# Patient Record
Sex: FEMALE | Race: OTHER | URBAN - METROPOLITAN AREA
[De-identification: names, ages, dates, MRNs, and addresses within clinical notes are randomized per-mention and may not be internally consistent; named-entity substitution may affect disease eponyms.]

---

## 2022-05-24 ENCOUNTER — HOSPITAL ENCOUNTER (EMERGENCY)
Facility: HOSPITAL | Age: 37
Discharge: HOME | End: 2022-05-24
Attending: EMERGENCY MEDICINE

## 2022-05-24 VITALS
DIASTOLIC BLOOD PRESSURE: 58 MMHG | OXYGEN SATURATION: 100 % | SYSTOLIC BLOOD PRESSURE: 109 MMHG | RESPIRATION RATE: 16 BRPM | TEMPERATURE: 97.6 F | HEART RATE: 91 BPM

## 2022-05-24 DIAGNOSIS — N12 PYELONEPHRITIS: Primary | ICD-10-CM

## 2022-05-24 DIAGNOSIS — R30.0 DYSURIA: ICD-10-CM

## 2022-05-24 LAB
BACTERIA UR QL AUTO: ABNORMAL /HPF
BILIRUB UR QL STRIP: NEGATIVE
CLARITY UR: ABNORMAL
COLOR UR: YELLOW
EXT PREG TEST URINE: NEGATIVE
EXT. CONTROL ED NAV: NORMAL
GLUCOSE UR STRIP-MCNC: NEGATIVE MG/DL
HGB UR QL STRIP.AUTO: ABNORMAL
KETONES UR STRIP-MCNC: NEGATIVE MG/DL
LEUKOCYTE ESTERASE UR QL STRIP: ABNORMAL
NITRITE UR QL STRIP: POSITIVE
NON-SQ EPI CELLS URNS QL MICRO: ABNORMAL /HPF
PH UR STRIP.AUTO: 6 [PH] (ref 4.5–8)
PROT UR STRIP-MCNC: ABNORMAL MG/DL
RBC #/AREA URNS AUTO: ABNORMAL /HPF
SP GR UR STRIP.AUTO: 1.01 (ref 1–1.03)
UROBILINOGEN UR QL STRIP.AUTO: 0.2 E.U./DL
WBC #/AREA URNS AUTO: ABNORMAL /HPF
WBC CLUMPS # UR AUTO: PRESENT /UL

## 2022-05-24 PROCEDURE — 81001 URINALYSIS AUTO W/SCOPE: CPT

## 2022-05-24 PROCEDURE — 96372 THER/PROPH/DIAG INJ SC/IM: CPT

## 2022-05-24 PROCEDURE — 99284 EMERGENCY DEPT VISIT MOD MDM: CPT | Performed by: EMERGENCY MEDICINE

## 2022-05-24 PROCEDURE — 87086 URINE CULTURE/COLONY COUNT: CPT

## 2022-05-24 PROCEDURE — 87186 SC STD MICRODIL/AGAR DIL: CPT

## 2022-05-24 PROCEDURE — 87077 CULTURE AEROBIC IDENTIFY: CPT

## 2022-05-24 PROCEDURE — 81025 URINE PREGNANCY TEST: CPT | Performed by: EMERGENCY MEDICINE

## 2022-05-24 PROCEDURE — 99283 EMERGENCY DEPT VISIT LOW MDM: CPT

## 2022-05-24 RX ORDER — NAPROXEN 500 MG/1
500 TABLET ORAL 2 TIMES DAILY PRN
Qty: 30 TABLET | Refills: 0 | Status: SHIPPED | OUTPATIENT
Start: 2022-05-24 | End: 2022-06-08

## 2022-05-24 RX ORDER — ACETAMINOPHEN 325 MG/1
650 TABLET ORAL ONCE
Status: COMPLETED | OUTPATIENT
Start: 2022-05-24 | End: 2022-05-24

## 2022-05-24 RX ORDER — CEFUROXIME AXETIL 500 MG/1
500 TABLET ORAL EVERY 12 HOURS SCHEDULED
Qty: 28 TABLET | Refills: 0 | Status: SHIPPED | OUTPATIENT
Start: 2022-05-24 | End: 2022-06-07

## 2022-05-24 RX ORDER — CEPHALEXIN 500 MG/1
500 CAPSULE ORAL ONCE
Status: COMPLETED | OUTPATIENT
Start: 2022-05-24 | End: 2022-05-24

## 2022-05-24 RX ORDER — KETOROLAC TROMETHAMINE 30 MG/ML
30 INJECTION, SOLUTION INTRAMUSCULAR; INTRAVENOUS ONCE
Status: COMPLETED | OUTPATIENT
Start: 2022-05-24 | End: 2022-05-24

## 2022-05-24 RX ADMIN — ACETAMINOPHEN 650 MG: 325 TABLET ORAL at 03:08

## 2022-05-24 RX ADMIN — KETOROLAC TROMETHAMINE 30 MG: 30 INJECTION, SOLUTION INTRAMUSCULAR at 03:08

## 2022-05-24 RX ADMIN — CEPHALEXIN 500 MG: 500 CAPSULE ORAL at 03:08

## 2022-05-24 NOTE — ED ATTENDING ATTESTATION
5/24/2022  ISixto MD, saw and evaluated the patient  I have discussed the patient with the resident/non-physician practitioner and agree with the resident's/non-physician practitioner's findings, Plan of Care, and MDM as documented in the resident's/non-physician practitioner's note, except where noted  All available labs and Radiology studies were reviewed  I was present for key portions of any procedure(s) performed by the resident/non-physician practitioner and I was immediately available to provide assistance  At this point I agree with the current assessment done in the Emergency Department  I have conducted an independent evaluation of this patient a history and physical is as follows:    ED Course      Emergency Department Note- Narda Lopez 31 y o  female MRN: [id number]    Unit/Bed#: ED 15 Encounter: [id number]    Narda Lopez is a 31 y o  female who presents with   Chief Complaint   Patient presents with    Urinary Urgency     X3 days pain with urination, urgency to void  Severe pain , started tonight travels up to L flank         History of Present Illness   HPI:  Narda Lopez is a 31 y o  female who presents for evaluation of:  Urinary urgency, dysuria, flank discomfort on L, and suprapubic discomfort  Patient denies associated fevers and chills  Review of Systems   Constitutional: Negative for chills and fever  HENT: Negative for congestion and rhinorrhea  Respiratory: Negative for cough and shortness of breath  Genitourinary: Positive for dysuria, flank pain and urgency  All other systems reviewed and are negative  Historical Information   No past medical history on file  No past surgical history on file    Social History   Social History     Substance and Sexual Activity   Alcohol Use Never     Social History     Substance and Sexual Activity   Drug Use Never     Social History     Tobacco Use   Smoking Status Current Every Day Smoker    Types: Cigarettes   Smokeless Tobacco Never Used     Family History: No family history on file  Meds/Allergies   PTA meds:   None     Allergies   Allergen Reactions    Contrast [Iodinated Diagnostic Agents] Seizures       Objective   First Vitals:   Blood Pressure: 109/58 (05/24/22 0230)  Pulse: 91 (05/24/22 0230)  Respirations: 16 (05/24/22 0230)  SpO2: 100 % (05/24/22 0230)    Current Vitals:   Blood Pressure: 109/58 (05/24/22 0230)  Pulse: 91 (05/24/22 0230)  Respirations: 16 (05/24/22 0230)  SpO2: 100 % (05/24/22 0230)    No intake or output data in the 24 hours ending 05/24/22 0249    Invasive Devices  Report    None                 Physical Exam  Vitals and nursing note reviewed  Constitutional:       General: She is not in acute distress  Appearance: Normal appearance  She is well-developed  HENT:      Head: Normocephalic and atraumatic  Right Ear: External ear normal       Left Ear: External ear normal       Nose: Nose normal       Mouth/Throat:      Pharynx: No oropharyngeal exudate  Eyes:      Conjunctiva/sclera: Conjunctivae normal       Pupils: Pupils are equal, round, and reactive to light  Cardiovascular:      Rate and Rhythm: Normal rate and regular rhythm  Pulmonary:      Effort: Pulmonary effort is normal  No respiratory distress  Abdominal:      General: Abdomen is flat  There is no distension  Palpations: Abdomen is soft  Musculoskeletal:         General: No deformity  Normal range of motion  Cervical back: Normal range of motion and neck supple  Skin:     General: Skin is warm and dry  Capillary Refill: Capillary refill takes less than 2 seconds  Neurological:      General: No focal deficit present  Mental Status: She is alert and oriented to person, place, and time  Mental status is at baseline        Coordination: Coordination normal    Psychiatric:         Mood and Affect: Mood normal          Behavior: Behavior normal          Thought Content: Thought content normal          Judgment: Judgment normal            Medical Decision Makin  Dysuria, flank pain: UA; treat for UTI    Recent Results (from the past 36 hour(s))   Urine Macroscopic, POC    Collection Time: 22  2:48 AM   Result Value Ref Range    Color, UA Yellow     Clarity, UA Cloudy     pH, UA 6 0 4 5 - 8 0    Leukocytes, UA Large (A) Negative    Nitrite, UA Positive (A) Negative    Protein,  (2+) (A) Negative mg/dl    Glucose, UA Negative Negative mg/dl    Ketones, UA Negative Negative mg/dl    Urobilinogen, UA 0 2 0 2, 1 0 E U /dl E U /dl    Bilirubin, UA Negative Negative    Blood, UA Large (A) Negative    Specific Gravity, UA 1 015 1 003 - 1 030     No orders to display         Portions of the record may have been created with voice recognition software  Occasional wrong word or "sound a like" substitutions may have occurred due to the inherent limitations of voice recognition software  Read the chart carefully and recognize, using context, where substitutions have occurred            Critical Care Time  Procedures

## 2022-05-24 NOTE — ED PROVIDER NOTES
History  Chief Complaint   Patient presents with    Urinary Urgency     X3 days pain with urination, urgency to void  Severe pain , started tonight travels up to L flank     56-year-old female presents emergency department for evaluation of 3 days of dysuria and urinary urgency  She states today she started with right-sided flank pain which prompted her evaluation  She states she has had a prior UTI although not this severe  She denies any fever, chills, chest pain, cough, or shortness of breath  No abdominal pain, nausea or vomiting  No constipation or diarrhea  No abnormal vaginal bleeding or discharge  Patient states she does have a history of pancreatitis, prior Caesarean sections, appendectomy, and cholecystectomy  History taken utilizing   None       No past medical history on file  No past surgical history on file  No family history on file  I have reviewed and agree with the history as documented  E-Cigarette/Vaping     E-Cigarette/Vaping Substances     Social History     Tobacco Use    Smoking status: Current Every Day Smoker     Types: Cigarettes    Smokeless tobacco: Never Used   Substance Use Topics    Alcohol use: Never    Drug use: Never        Review of Systems   Constitutional: Negative for chills and fever  HENT: Negative for ear pain and sore throat  Eyes: Negative for pain and visual disturbance  Respiratory: Negative for cough and shortness of breath  Cardiovascular: Negative for chest pain and palpitations  Gastrointestinal: Negative for abdominal pain, diarrhea, nausea and vomiting  Genitourinary: Positive for dysuria, flank pain and urgency  Negative for hematuria, vaginal bleeding and vaginal discharge  Musculoskeletal: Negative for arthralgias and back pain  Skin: Negative for color change and rash  Neurological: Negative for seizures and syncope  All other systems reviewed and are negative        Physical Exam  ED Triage Vitals   Temperature Pulse Respirations Blood Pressure SpO2   05/24/22 0340 05/24/22 0230 05/24/22 0230 05/24/22 0230 05/24/22 0230   97 6 °F (36 4 °C) 91 16 109/58 100 %      Temp Source Heart Rate Source Patient Position - Orthostatic VS BP Location FiO2 (%)   05/24/22 0340 05/24/22 0230 05/24/22 0230 05/24/22 0230 --   Tympanic Monitor Sitting Right arm       Pain Score       05/24/22 0230       10 - Worst Possible Pain             Orthostatic Vital Signs  Vitals:    05/24/22 0230   BP: 109/58   Pulse: 91   Patient Position - Orthostatic VS: Sitting       Physical Exam  Vitals reviewed  Constitutional:       General: She is not in acute distress  HENT:      Head: Normocephalic and atraumatic  Right Ear: External ear normal       Left Ear: External ear normal       Nose: Nose normal       Mouth/Throat:      Mouth: Mucous membranes are moist    Eyes:      Extraocular Movements: Extraocular movements intact  Conjunctiva/sclera: Conjunctivae normal       Pupils: Pupils are equal, round, and reactive to light  Cardiovascular:      Rate and Rhythm: Normal rate and regular rhythm  Pulses: Normal pulses  Pulmonary:      Effort: Pulmonary effort is normal  No respiratory distress  Breath sounds: No stridor  Abdominal:      General: Abdomen is flat  Bowel sounds are normal       Tenderness: There is no abdominal tenderness  There is no guarding or rebound  Musculoskeletal:         General: No deformity  Normal range of motion  Cervical back: Normal range of motion and neck supple  Skin:     General: Skin is warm and dry  Capillary Refill: Capillary refill takes less than 2 seconds  Neurological:      General: No focal deficit present  Mental Status: She is alert and oriented to person, place, and time     Psychiatric:         Mood and Affect: Mood normal          Behavior: Behavior normal          ED Medications  Medications   ketorolac (TORADOL) injection 30 mg (30 mg Intramuscular Given 5/24/22 0308)   acetaminophen (TYLENOL) tablet 650 mg (650 mg Oral Given 5/24/22 0308)   cephalexin (KEFLEX) capsule 500 mg (500 mg Oral Given 5/24/22 0308)       Diagnostic Studies  Results Reviewed     Procedure Component Value Units Date/Time    Urine Microscopic [[id number]]  (Abnormal) Collected: 05/24/22 0248    Lab Status: Final result Specimen: Urine, Clean Catch Updated: 05/24/22 0336     RBC, UA Innumerable /hpf      WBC, UA Innumerable /hpf      Epithelial Cells None Seen /hpf      Bacteria, UA Innumerable /hpf      WBC Clumps PRESENT    Urine culture [[id number]] Collected: 05/24/22 0248    Lab Status: In process Specimen: Urine, Clean Catch Updated: 05/24/22 0336    POCT pregnancy, urine [[id number]]  (Normal) Resulted: 05/24/22 0301    Lab Status: Final result Updated: 05/24/22 0301     EXT PREG TEST UR (Ref: Negative) Negative     Control Valid    Urine Macroscopic, POC [[id number]]  (Abnormal) Collected: 05/24/22 0248    Lab Status: Final result Specimen: Urine Updated: 05/24/22 0249     Color, UA Yellow     Clarity, UA Cloudy     pH, UA 6 0     Leukocytes, UA Large     Nitrite, UA Positive     Protein,  (2+) mg/dl      Glucose, UA Negative mg/dl      Ketones, UA Negative mg/dl      Urobilinogen, UA 0 2 E U /dl      Bilirubin, UA Negative     Blood, UA Large     Specific [place], UA 1 015    Narrative:      CLINITEK RESULT                 No orders to display         Procedures  Procedures      ED Course  ED Course as of 05/24/22 0343   Tue May 24, 2022   0250 Leukocytes, UA(!): Large   0250 Nitrite, UA(!): Positive   0303 PREGNANCY TEST URINE: Negative                             SBIRT 20yo+    Flowsheet Row Most Recent Value   SBIRT (23 yo +)    In order to provide better care to our patients, we are screening all of our patients for alcohol and drug use  Would it be okay to ask you these screening questions?  Yes Filed at: 05/24/2022 0236   Initial Alcohol Screen: US AUDIT-C 1  How often do you have a drink containing alcohol? 0 Filed at: 05/24/2022 0236   2  How many drinks containing alcohol do you have on a typical day you are drinking? 0 Filed at: 05/24/2022 0236   3b  FEMALE Any Age, or MALE 65+: How often do you have 4 or more drinks on one occassion? 0 Filed at: 05/24/2022 0236   Audit-C Score 0 Filed at: 05/24/2022 0236   FARA: How many times in the past year have you    Used an illegal drug or used a prescription medication for non-medical reasons? Never Filed at: 05/24/2022 0236                MDM  Number of Diagnoses or Management Options  Dysuria  Pyelonephritis  Diagnosis management comments: 79-year-old female presents the ED for evaluation of urinary symptoms and right flank pain  On exam she is uncomfortable appearing but in no acute distress  Abdomen is benign, she does have CVA tenderness on the right  Urinalysis indicative of infection  Will treat for pyelonephritis  Will give dose of antibiotic here prescription  She was given strict return precautions  Disposition  Final diagnoses:   Pyelonephritis   Dysuria     Time reflects when diagnosis was documented in both MDM as applicable and the Disposition within this note     Time User Action Codes Description Comment    5/24/2022  2:52 AM Check, Alleen Smoker Add [N12] Pyelonephritis     5/24/2022  2:52 AM Check, Alleen Smoker Add [R30 0] Dysuria       ED Disposition     ED Disposition   Discharge    Condition   Stable    Date/Time   Tue May 24, 2022  2:53 AM    Comment   Lionel Miller discharge to home/self care                 Follow-up Information     Follow up With Specialties Details Why Contact Info Additional [address] Emergency Department Emergency Medicine  If symptoms worsen [address]  [address] [place] [address] Emergency Department, [address] [address] Patient's Medications   Discharge Prescriptions    CEFUROXIME (CEFTIN) 500 MG TABLET    Take 1 tablet (500 mg total) by mouth every 12 (twelve) hours for 14 days       Start Date: 5/24/2022 End Date: 6/7/2022       Order Dose: 500 mg       Quantity: 28 tablet    Refills: 0    NAPROXEN (NAPROSYN) 500 MG TABLET    Take 1 tablet (500 mg total) by mouth as needed in the morning and 1 tablet (500 mg total) as needed in the evening for mild pain or moderate pain  Do all this for up to 15 days  Start Date: 5/24/2022 End Date: 6/8/2022       Order Dose: 500 mg       Quantity: 30 tablet    Refills: 0     No discharge procedures on file  PDMP Review     None           ED Provider  Attending physically available and evaluated Lilliana Navarrete I managed the patient along with the ED Attending      Electronically Signed by         Meche Weaver MD  05/24/22 0843

## 2022-05-26 LAB — BACTERIA UR CULT: ABNORMAL

## 2023-04-20 ENCOUNTER — HOSPITAL ENCOUNTER (EMERGENCY)
Facility: HOSPITAL | Age: 38
Discharge: HOME | End: 2023-04-20
Attending: EMERGENCY MEDICINE | Admitting: EMERGENCY MEDICINE

## 2023-04-20 ENCOUNTER — APPOINTMENT (EMERGENCY)
Dept: RADIOLOGY | Facility: HOSPITAL | Age: 38
End: 2023-04-20

## 2023-04-20 VITALS
OXYGEN SATURATION: 97 % | RESPIRATION RATE: 17 BRPM | SYSTOLIC BLOOD PRESSURE: 129 MMHG | HEART RATE: 84 BPM | TEMPERATURE: 97.4 F | DIASTOLIC BLOOD PRESSURE: 68 MMHG

## 2023-04-20 DIAGNOSIS — M79.641 RIGHT HAND PAIN: Primary | ICD-10-CM

## 2023-04-20 RX ORDER — ACETAMINOPHEN 325 MG/1
650 TABLET ORAL ONCE
Status: COMPLETED | OUTPATIENT
Start: 2023-04-20 | End: 2023-04-20

## 2023-04-20 RX ADMIN — ACETAMINOPHEN 650 MG: 325 TABLET ORAL at 03:49

## 2023-04-20 NOTE — Clinical Note
Allison Irby was seen and treated in our emergency department on 4/20/2023  Limited use of right hand    Diagnosis:     Kaleb Reyes  may return to work on return date  She may return on this date: 04/21/2023         If you have any questions or concerns, please don't hesitate to call        Sukhdev Gonzales, DO    ______________________________           _______________          _______________  Hospital Representative                              Date                                Time

## 2023-04-20 NOTE — DISCHARGE INSTRUCTIONS
You were seen in the emergency department today for right hand pain  Your testing showed no evidence of fracture  Follow up with hand surgery if your pain does not improve  Take Tylenol / Ibuprofen as needed following the instructions on the bottle  Return to the emergency department for any new or concerning symptoms including worsening pain  Thank you for choosing [facility] for your care today

## 2023-04-20 NOTE — ED PROVIDER NOTES
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 Taking?   naproxen (Naprosyn) 500 mg tablet   No No   Sig: Take 1 tablet (500 mg total) by mouth as needed in the morning and 1 tablet (500 mg total) as needed in the evening for mild pain or moderate pain  Do all this for up to 15 days  Facility-Administered Medications: None       History reviewed  No pertinent past medical history  Past Surgical History:   Procedure Laterality Date   • APPENDECTOMY     • CHOLECYSTECTOMY         History reviewed  No pertinent family history  I have reviewed and agree with the history as documented      E-Cigarette/Vaping     E-Cigarette/Vaping Substances     Social History     Tobacco Use   • Smoking status: Every Day     Packs/day: 1 00     Types: Cigarettes   • Smokeless tobacco: Never   Substance Use Topics   • Alcohol use: Never   • Drug use: Never        Review of Systems    Physical Exam  ED Triage Vitals [04/20/23 0157]   Temperature Pulse Respirations Blood Pressure SpO2   (!) 97 4 °F (36 3 °C) 84 17 129/68 97 %      Temp Source Heart Rate Source Patient Position - Orthostatic VS BP Location FiO2 (%)   Tympanic Monitor Lying Right arm --      Pain Score       --             Orthostatic Vital Signs  Vitals:    04/20/23 0157   BP: 129/68   Pulse: 84   Patient Position - Orthostatic VS: Lying       Physical Exam    ED Medications  Medications - No data to display    Diagnostic Studies  Results Reviewed     None                 No orders to display         Procedures  Procedures      ED Course MDM      Disposition  Final diagnoses:   None     ED Disposition     None      Follow-up Information    None         Patient's Medications   Discharge Prescriptions    No medications on file     No discharge procedures on file  PDMP Review     None           ED Provider  Attending physically available and evaluated Almas Alas I managed the patient along with the ED Attending      Electronically Signed by Sher Silva MD (04/20 0691)      No acute osseous abnormality  Workstation performed: [id number]               Procedures  Procedures      ED Course                                       MDM   Patient is a 30-year-old female with PMH of no relevant past medical history who presents to the ED with right middle finger pain secondary to injury  Vital signs stable  On exam tenderness to palpation, intact distal sensation, normal motor function of middle finger  History and exam most consistent with strain  No evidence of ligamentous injury, fracture unlikely  Will obtain x-ray and treat pain control at this time  View ED course above for further discussion on patient workup  Imaging findings as interpreted by me: No acute fracture  I reviewed all testing with the patient  Upon re-evaluation pain improved, sensation remains intact  I have reviewed the patient's vital signs, nursing notes, and other relevant tests/information  I had a detailed discussion with the patient regarding the history, exam findings, and any diagnostic results  Plan to discharge home in stable condition with follow up with hand surgery  Discussed with patient who is agreeable to plan  I discussed discharge instructions, need for follow-up, and oral return precautions for what to return for in addition to the written return precautions and discharge instructions, specifically highlighting areas of special concern  The patient verbalized understanding of the discharge instructions and warnings that would necessitate return to the Emergency Department including worsening pain, decreased sensation  All questions the patient had were answered prior to discharge         Disposition  Final diagnoses:   Right hand pain     Time reflects when diagnosis was documented in both MDM as applicable and the Disposition within this note     Time User Action Codes Description Comment    4/20/2023  3:37 AM Shu Stewart [P86 526] Right hand pain       ED Disposition     ED Disposition   Discharge    Condition   Stable    Date/Time   Thu Apr 20, 2023  4:43 AM    Comment   Ingrid Gomez discharge to home/self care  Follow-up Information     Follow up With Specialties Details Why Contact Info    [employer]  Call  As needed [phone]            Discharge Medication List as of 4/20/2023  4:44 AM      CONTINUE these medications which have NOT CHANGED    Details   naproxen (Naprosyn) 500 mg tablet Take 1 tablet (500 mg total) by mouth as needed in the morning and 1 tablet (500 mg total) as needed in the evening for mild pain or moderate pain  Do all this for up to 15 days  , Starting Tue 5/24/2022, Until Wed 6/8/2022 at 2359, Normal               PDMP Review     None           ED Provider  Attending physically available and evaluated Ingrid Gomez  I managed the patient along with the ED Attending      Electronically Signed by         Ken Foreman DO  04/24/23 9655

## 2023-04-20 NOTE — ED ATTENDING ATTESTATION
4/20/2023  INataliia MD, saw and evaluated the patient  I have discussed the patient with the resident/non-physician practitioner and agree with the resident's/non-physician practitioner's findings, Plan of Care, and MDM as documented in the resident's/non-physician practitioner's note, except where noted  All available labs and Radiology studies were reviewed  I was present for key portions of any procedure(s) performed by the resident/non-physician practitioner and I was immediately available to provide assistance  At this point I agree with the current assessment done in the Emergency Department    I have conducted an independent evaluation of this patient a history and physical is as follows:    ED Course          Critical Care Time  Procedures

## 2023-05-08 NOTE — ED ATTENDING ATTESTATION
4/20/2023  IVivek MD, saw and evaluated the patient  I have discussed the patient with the resident/non-physician practitioner and agree with the resident's/non-physician practitioner's findings, Plan of Care, and MDM as documented in the resident's/non-physician practitioner's note, except where noted  All available labs and Radiology studies were reviewed  I was present for key portions of any procedure(s) performed by the resident/non-physician practitioner and I was immediately available to provide assistance  At this point I agree with the current assessment done in the Emergency Department  I have conducted an independent evaluation of this patient a history and physical is as follows:    ED Course     Patient presents for evaluation secondary to R hand pain after getting her middle finger bent backwards while at work  Reports pain in her hand and forearm, No additional injuries or complaints  A/P: R hand pain  Concern for hyperextension injury  Will check xray and have patient follow up with hand       Critical Care Time  Procedures

## 2023-09-18 NOTE — DISCHARGE INSTRUCTIONS
Take antibiotics as prescribed for a total of 14 days  Take Naprosyn 1 tablet every 12 hours as needed for pain    Follow-up with primary care physician Vital Signs Last 24 Hrs  T(C): 36.6 (18 Sep 2023 08:45), Max: 36.9 (18 Sep 2023 04:56)  T(F): 97.8 (18 Sep 2023 08:45), Max: 98.4 (18 Sep 2023 04:56)  HR: 76 (18 Sep 2023 08:45) (76 - 81)  BP: 138/91 (18 Sep 2023 08:45) (111/76 - 138/92)  BP(mean): 106 (18 Sep 2023 08:45) (106 - 106)  RR: 16 (18 Sep 2023 08:45) (16 - 18)  SpO2: 96% (18 Sep 2023 08:45) (96% - 98%)    Parameters below as of 18 Sep 2023 08:45  Patient On (Oxygen Delivery Method): room air